# Patient Record
(demographics unavailable — no encounter records)

---

## 2017-09-28 NOTE — RAD
DATE: 9/28/2017



EXAM: DIGITAL SCREEN BILAT W/CAD



HISTORY: Routine screening



COMPARISON: 11/23/2015



This study was interpreted with the benefit of Computerized Aided Detection

(CAD).







FINDINGS:

Breast Density:  HETERO  The breast parenchyma Is heterogeneiously dense,

which could reduce sensitivity of mammography. Breast parenchyma level C. 

There are no dominant suspicious masses, suspicious microcalcifications or

evidence of architectural distortion.



      







IMPRESSION: Negative mammogram







BI-RADS CATEGORY: 1 NEGATIVE



RECOMMENDED FOLLOW-UP: 12M 12 MONTH FOLLOW-UP



PQRS compliance statement: Patient information was entered into a reminder

system with a target due date 9/28/2018 for the next mammogram.



Mammography is a sensitive method for finding small breast cancers, but it

does not detect them all and is not a substitute for careful clinical

examination.  A negative mammogram does not negate a clinically suspicious

finding and should not result in delay in biopsying a clinically suspicious

abnormality.



"Our facility is accredited by the [employer] Mammography

Program."